# Patient Record
Sex: FEMALE | Race: BLACK OR AFRICAN AMERICAN | Employment: FULL TIME | URBAN - METROPOLITAN AREA
[De-identification: names, ages, dates, MRNs, and addresses within clinical notes are randomized per-mention and may not be internally consistent; named-entity substitution may affect disease eponyms.]

---

## 2020-12-06 ENCOUNTER — HOSPITAL ENCOUNTER (EMERGENCY)
Age: 25
Discharge: HOME | End: 2020-12-06
Attending: EMERGENCY MEDICINE
Payer: MEDICAID

## 2020-12-06 ENCOUNTER — APPOINTMENT (OUTPATIENT)
Dept: GENERAL RADIOLOGY | Age: 25
End: 2020-12-06
Attending: PHYSICIAN ASSISTANT
Payer: MEDICAID

## 2020-12-06 VITALS
SYSTOLIC BLOOD PRESSURE: 142 MMHG | RESPIRATION RATE: 19 BRPM | DIASTOLIC BLOOD PRESSURE: 88 MMHG | WEIGHT: 260 LBS | HEART RATE: 65 BPM | TEMPERATURE: 97.8 F | BODY MASS INDEX: 39.4 KG/M2 | HEIGHT: 68 IN | OXYGEN SATURATION: 99 %

## 2020-12-06 DIAGNOSIS — S86.912A KNEE STRAIN, LEFT, INITIAL ENCOUNTER: Primary | ICD-10-CM

## 2020-12-06 PROCEDURE — 73564 X-RAY EXAM KNEE 4 OR MORE: CPT

## 2020-12-06 PROCEDURE — 99283 EMERGENCY DEPT VISIT LOW MDM: CPT

## 2020-12-06 RX ORDER — ESTRADIOL VALERATE 20 MG/ML
20 INJECTION INTRAMUSCULAR EVERY 2 WEEKS
COMMUNITY
Start: 2020-01-04

## 2020-12-06 RX ORDER — TRAMADOL HYDROCHLORIDE 50 MG/1
50 TABLET ORAL
Qty: 20 TAB | Refills: 0 | Status: SHIPPED | OUTPATIENT
Start: 2020-12-06 | End: 2020-12-09

## 2020-12-06 NOTE — ED PROVIDER NOTES
EMERGENCY DEPARTMENT HISTORY AND PHYSICAL EXAM    Date: 12/6/2020  Patient Name: Chris Valencia    History of Presenting Illness     Chief Complaint   Patient presents with    Knee Pain         History Provided By: Patient    Chris Valencia is a 31 y.o. female who presents to the emergency department C/O left knee pain. Reports left medial knee pain acute onset yesterday after moving from a's stooping to standing position once standing felt and heard a pop in her knee which caused her to fall. She is able to stand and bear weight however with increased pain. Pt denies head injury, neck pain, wounds, pain anywhere else, and any other sxs or complaints. PCP: Elliott Yañez MD    Current Outpatient Medications   Medication Sig Dispense Refill    estradiol valerate (DELESTROGEN) 20 mg/mL oil 20 mg by IntraMUSCular route Once every 2 weeks.  traMADoL (Ultram) 50 mg tablet Take 1 Tab by mouth every six (6) hours as needed for Pain for up to 3 days. Max Daily Amount: 200 mg. 20 Tab 0       Past History     Past Medical History:  History reviewed. No pertinent past medical history. Past Surgical History:  History reviewed. No pertinent surgical history. Family History:  History reviewed. No pertinent family history. Social History:  Social History     Tobacco Use    Smoking status: Never Smoker    Smokeless tobacco: Never Used   Substance Use Topics    Alcohol use: Not Currently    Drug use: No       Allergies:  No Known Allergies      Review of Systems   Review of Systems   Musculoskeletal: Positive for arthralgias. Skin: Negative for wound. All other systems reviewed and are negative. Physical Exam     Vitals:    12/06/20 1524   BP: (!) 142/88   Pulse: 65   Resp: 19   Temp: 97.8 °F (36.6 °C)   SpO2: 99%   Weight: 117.9 kg (260 lb)   Height: 5' 8\" (1.727 m)     Physical Exam  Vitals signs and nursing note reviewed. Constitutional:       General: She is not in acute distress. Appearance: Normal appearance. She is obese. She is not ill-appearing. HENT:      Head: Normocephalic and atraumatic. Eyes:      Extraocular Movements: Extraocular movements intact. Conjunctiva/sclera: Conjunctivae normal.      Pupils: Pupils are equal, round, and reactive to light. Neck:      Musculoskeletal: Normal range of motion and neck supple. Musculoskeletal:      Left hip: Normal.      Left knee: She exhibits decreased range of motion. She exhibits no swelling. Tenderness found. Left ankle: Normal. She exhibits normal pulse. Legs:    Skin:     General: Skin is warm and dry. Capillary Refill: Capillary refill takes less than 2 seconds. Neurological:      General: No focal deficit present. Mental Status: She is alert and oriented to person, place, and time. Psychiatric:         Mood and Affect: Mood normal.         Behavior: Behavior normal.       Diagnostic Study Results     Labs -   No results found for this or any previous visit (from the past 12 hour(s)). Radiologic Studies -   XR KNEE LT MIN 4 V   Final Result   IMPRESSION:      Mild degenerative changes. No acute bony abnormality. CT Results  (Last 48 hours)    None        CXR Results  (Last 48 hours)    None          Medications given in the ED-  Medications - No data to display      Medical Decision Making   I am the first provider for this patient. I reviewed the vital signs, available nursing notes, past medical history, past surgical history, family history and social history. Vital Signs-Reviewed the patient's vital signs. Pulse Oximetry Analysis - 99% on RA     Records Reviewed: Nursing Notes    Procedures:  Procedures    ED Course:   3:31 PM   Initial assessment performed. The patients presenting problems have been discussed, and they are in agreement with the care plan formulated and outlined with them.   I have encouraged them to ask questions as they arise throughout their visit. Discussion: 31 y.o. female ambulatory emergency department with 24 hours of left knee pain after standing up and hearing and feeling a pop. She is neurovascular intact appropriate vital signs negative x-rays. Plan for immobilizer crutches pain control orthopedic follow-up. Return precautions discussed. Diagnosis and Disposition       DISCHARGE NOTE:  Zena Galeazzi  results have been reviewed with her. She has been counseled regarding her diagnosis, treatment, and plan. She verbally conveys understanding and agreement of the signs, symptoms, diagnosis, treatment and prognosis and additionally agrees to follow up as discussed. She also agrees with the care-plan and conveys that all of her questions have been answered. I have also provided discharge instructions for her that include: educational information regarding their diagnosis and treatment, and list of reasons why they would want to return to the ED prior to their follow-up appointment, should her condition change. She has been provided with education for proper emergency department utilization. CLINICAL IMPRESSION:    1. Knee strain, left, initial encounter        PLAN:  1. D/C Home  2. Current Discharge Medication List      START taking these medications    Details   traMADoL (Ultram) 50 mg tablet Take 1 Tab by mouth every six (6) hours as needed for Pain for up to 3 days. Max Daily Amount: 200 mg. Qty: 20 Tab, Refills: 0    Associated Diagnoses: Knee strain, left, initial encounter           3.    Follow-up Information     Follow up With Specialties Details Why Contact Info    Franci Ramsey MD Orthopedic Surgery Schedule an appointment as soon as possible for a visit  [place] [address] [address] [facility]      [facility] EMERGENCY DEPT Emergency Medicine  As needed, If symptoms worsen [address] [address]  [phone]                  Please note that this dictation was completed with Vendscreen, the [employer] voice recognition software. Quite often unanticipated grammatical, syntax, homophones, and other interpretive errors are inadvertently transcribed by the computer software. Please disregard these errors. Please excuse any errors that have escaped final proofreading.

## 2020-12-06 NOTE — LETTER
NOTIFICATION RETURN TO WORK / SCHOOL 
 
12/6/2020 4:07 PM 
 
Ms. Alberta Kwon Gian [address] [address] To Whom It May Concern: 
 
Alberta Kwon Shmuelor is currently under the care of THE [facility] EMERGENCY DEPT. She will return to work 12/9/2020 If there are questions or concerns please have the patient contact our office.  
 
 
 
Sincerely, 
 
 
JAMES Steel

## 2020-12-06 NOTE — ED TRIAGE NOTES
Pt w/ c/o LEFT knee pain after hearing it pop yesterday when she stood up abruptly yesterday. Pt reports she is able to bear some weight but unable walk on it or bend it.

## 2021-08-17 ENCOUNTER — HOSPITAL ENCOUNTER (EMERGENCY)
Age: 26
Discharge: HOME | End: 2021-08-17
Attending: EMERGENCY MEDICINE
Payer: MEDICAID

## 2021-08-17 VITALS
HEART RATE: 93 BPM | RESPIRATION RATE: 20 BRPM | BODY MASS INDEX: 42.44 KG/M2 | DIASTOLIC BLOOD PRESSURE: 68 MMHG | TEMPERATURE: 98.2 F | WEIGHT: 280 LBS | OXYGEN SATURATION: 100 % | SYSTOLIC BLOOD PRESSURE: 156 MMHG | HEIGHT: 68 IN

## 2021-08-17 DIAGNOSIS — G43.009 MIGRAINE WITHOUT AURA AND WITHOUT STATUS MIGRAINOSUS, NOT INTRACTABLE: Primary | ICD-10-CM

## 2021-08-17 PROCEDURE — 99282 EMERGENCY DEPT VISIT SF MDM: CPT

## 2021-08-17 RX ORDER — KETOROLAC TROMETHAMINE 10 MG/1
10 TABLET, FILM COATED ORAL
Qty: 12 TABLET | Refills: 0 | Status: SHIPPED | OUTPATIENT
Start: 2021-08-17 | End: 2021-11-19

## 2021-08-17 NOTE — ED PROVIDER NOTES
EMERGENCY DEPARTMENT HISTORY AND PHYSICAL EXAM    Date: 8/17/2021  Patient Name: Jay Bermeo    History of Presenting Illness     Chief Complaint   Patient presents with    Migraine         History Provided By: Patient    Chief Complaint: migraine    HPI(Context):   12:46 PM  Jay Bermeo is a 31 y.o. female with PMHX of migraines who presents to the emergency department C/O migraine headache. Sxs started this AM. Gradual onset. HA is global. Pt took OTC medications and sxs improved. Pt missed work and states she is required to come to ED for work note. HA c/w past migraines with no atypical sxs. Pt recently had zahra applied to hair and scalp. Pt loosened them this AM. Pt denies fever, chills, neck pain, nausea, vomiting, head trauma, numbness, weakness, COVID exposures, and any other sxs or complaints. PCP: Elliott Yañez MD    Current Outpatient Medications   Medication Sig Dispense Refill    ketorolac (TORADOL) 10 mg tablet Take 1 Tablet by mouth every six (6) hours as needed for Pain. Take with food. Do NOT take with other NSAIDs (ibuprofen, naproxen) 12 Tablet 0    estradiol valerate (DELESTROGEN) 20 mg/mL oil 20 mg by IntraMUSCular route Once every 2 weeks. Past History     Past Medical History:  History reviewed. No pertinent past medical history. Past Surgical History:  History reviewed. No pertinent surgical history. Family History:  History reviewed. No pertinent family history. Social History:  Social History     Tobacco Use    Smoking status: Never Smoker    Smokeless tobacco: Never Used   Substance Use Topics    Alcohol use: Not Currently    Drug use: No       Allergies:  No Known Allergies      Review of Systems   Review of Systems   Constitutional: Negative for chills and fever. Eyes: Negative for photophobia and visual disturbance. Gastrointestinal: Negative for nausea and vomiting. Musculoskeletal: Negative for neck pain.    Neurological: Positive for headaches. Negative for weakness and numbness. All other systems reviewed and are negative. Physical Exam     Vitals:    08/17/21 1239   BP: (!) 156/68   Pulse: 93   Resp: 20   Temp: 98.2 °F (36.8 °C)   SpO2: 100%   Weight: 127 kg (280 lb)   Height: 5' 8\" (1.727 m)     Physical Exam  Vitals and nursing note reviewed. Constitutional:       General: She is not in acute distress. Appearance: She is well-developed. She is not diaphoretic. Comments: AA female in NAD. Alert. Appears comfortable. In fast track room   HENT:      Head: Normocephalic and atraumatic. Right Ear: External ear normal.      Left Ear: External ear normal.      Nose: Nose normal.   Eyes:      General: No scleral icterus. Right eye: No discharge. Left eye: No discharge. Extraocular Movements: Extraocular movements intact. Conjunctiva/sclera: Conjunctivae normal.      Pupils: Pupils are equal, round, and reactive to light. Cardiovascular:      Rate and Rhythm: Normal rate and regular rhythm. Heart sounds: Normal heart sounds. No murmur heard. No friction rub. No gallop. Pulmonary:      Effort: Pulmonary effort is normal. No tachypnea, accessory muscle usage or respiratory distress. Breath sounds: Normal breath sounds. No decreased breath sounds, wheezing, rhonchi or rales. Musculoskeletal:         General: Normal range of motion. Cervical back: Normal range of motion and neck supple. Skin:     General: Skin is warm and dry. Findings: No erythema or rash. Neurological:      Mental Status: She is alert and oriented to person, place, and time. GCS: GCS eye subscore is 4. GCS verbal subscore is 5. GCS motor subscore is 6. Cranial Nerves: Cranial nerves are intact. Sensory: Sensation is intact. Motor: Motor function is intact. Coordination: Coordination is intact. Gait: Gait is intact.    Psychiatric:         Behavior: Behavior normal. Judgment: Judgment normal.             Diagnostic Study Results     Labs -   No results found for this or any previous visit (from the past 12 hour(s)). No orders to display     CT Results  (Last 48 hours)    None        CXR Results  (Last 48 hours)    None          Medications given in the ED-  Medications - No data to display      Medical Decision Making   I am the first provider for this patient. I reviewed the vital signs, available nursing notes, past medical history, past surgical history, family history and social history. Vital Signs-Reviewed the patient's vital signs. Pulse Oximetry Analysis - 100% on RA. NORMAL     Records Reviewed: Nursing Notes    Provider Notes (Medical Decision Making): migraine, tension, cluster, sinusitis, pseudotumor, TA. Not c/w SAH. Doubt CVA/TIA or meningitis. Procedures:  Procedures    ED Course:   12:46 PM Initial assessment performed. The patients presenting problems have been discussed, and they are in agreement with the care plan formulated and outlined with them. I have encouraged them to ask questions as they arise throughout their visit. Diagnosis and Disposition       Gradual onset HA. Not thunderclap. HA similar to previous migraines. Came to ED for work note. No FND. Will Rx NSAIDs. Work note provided. FU with neurology. No indication for emergent intracranial imaging or LP. Reasons to RTED discussed with pt. All questions answered. Pt feels comfortable going home at this time. Pt expressed understanding and she agrees with plan. 1. Migraine without aura and without status migrainosus, not intractable        PLAN:  1. D/C Home  2. Discharge Medication List as of 8/17/2021  2:14 PM        3.    Follow-up Information     Follow up With Specialties Details Why Contact Maryan Purcell MD Neurology  may follow up with neurologist [address] [place], [place] [address] [place] [address]  [phone]      [facility] EMERGENCY DEPT Emergency [place] [address] [address]  [phone]        _______________________________    Attestations: This note is prepared by Meghana Calero PA-C.  _______________________________          Please note that this dictation was completed with [email or web address], the computer voice recognition software. Quite often unanticipated grammatical, syntax, homophones, and other interpretive errors are inadvertently transcribed by the computer software. Please disregard these errors. Please excuse any errors that have escaped final proofreading.

## 2021-08-17 NOTE — CALL BACK NOTE
I have set patient up with her pcp Vilma Najera on august [email or web address] pt was given a appointment reminder letter

## 2021-08-17 NOTE — LETTER
[facility] [place]  [facility] EMERGENCY DEPT  [address] [facility] [address] [phone]    Work/School Note    Date: 8/17/2021    To Whom It May concern:    Alberta Ty was seen and treated today in the emergency room by the following provider(s):  Attending Provider: Jay Harris DO  Physician Assistant: Uzair Torrez PA-C. Alberta Ty may return to work on 08/18/2021.     Sincerely,          Jenn Sullivan PA-C

## 2021-09-23 ENCOUNTER — HOSPITAL ENCOUNTER (EMERGENCY)
Age: 26
Discharge: HOME | End: 2021-09-23
Attending: EMERGENCY MEDICINE | Admitting: EMERGENCY MEDICINE
Payer: MEDICAID

## 2021-09-23 ENCOUNTER — APPOINTMENT (OUTPATIENT)
Dept: GENERAL RADIOLOGY | Age: 26
End: 2021-09-23
Attending: EMERGENCY MEDICINE
Payer: MEDICAID

## 2021-09-23 VITALS
HEART RATE: 77 BPM | DIASTOLIC BLOOD PRESSURE: 78 MMHG | TEMPERATURE: 98 F | HEIGHT: 69 IN | WEIGHT: 270 LBS | RESPIRATION RATE: 16 BRPM | OXYGEN SATURATION: 100 % | SYSTOLIC BLOOD PRESSURE: 120 MMHG | BODY MASS INDEX: 39.99 KG/M2

## 2021-09-23 DIAGNOSIS — S61.212A LACERATION OF RIGHT MIDDLE FINGER WITHOUT FOREIGN BODY WITHOUT DAMAGE TO NAIL, INITIAL ENCOUNTER: ICD-10-CM

## 2021-09-23 DIAGNOSIS — S60.00XA CONTUSION OF FINGER OF RIGHT HAND, INITIAL ENCOUNTER: Primary | ICD-10-CM

## 2021-09-23 PROCEDURE — 73140 X-RAY EXAM OF FINGER(S): CPT

## 2021-09-23 PROCEDURE — 99283 EMERGENCY DEPT VISIT LOW MDM: CPT

## 2021-09-23 NOTE — ED PROVIDER NOTES
EMERGENCY DEPARTMENT HISTORY AND PHYSICAL EXAM    Date: 9/23/2021  Patient Name: Seb Mcintyre    History of Presenting Illness     Chief Complaint   Patient presents with    Finger Pain         History Provided By: Patient    Chief Complaint: finger pain    HPI(Context):   3:15 PM  Seb Mcintyre is a 31 y.o. female who presents to the emergency department C/O right third finger injury. Associated sxs include swelling and pain. PTA, pt accidentally shut finger in car door. Pt applied pressure with bleeding stopping. Pt applied bandaid but came to ED to make sure no finger was not broken. Pain worse with movement. Pt denies numbness, weakness, and any other sxs or complaints. Tetanus UTD per patient    PCP: Otilio, MD Elliott    Current Outpatient Medications   Medication Sig Dispense Refill    estradiol valerate (DELESTROGEN) 20 mg/mL oil 20 mg by IntraMUSCular route Once every 2 weeks.  ketorolac (TORADOL) 10 mg tablet Take 1 Tablet by mouth every six (6) hours as needed for Pain. Take with food. Do NOT take with other NSAIDs (ibuprofen, naproxen) 12 Tablet 0       Past History     Past Medical History:  History reviewed. No pertinent past medical history. Past Surgical History:  History reviewed. No pertinent surgical history. Family History:  History reviewed. No pertinent family history. Social History:  Social History     Tobacco Use    Smoking status: Never Smoker    Smokeless tobacco: Never Used   Substance Use Topics    Alcohol use: Not Currently    Drug use: No       Allergies:  No Known Allergies      Review of Systems   Review of Systems   Musculoskeletal: Positive for arthralgias and joint swelling. Skin: Positive for wound. Negative for color change. Neurological: Negative for weakness and numbness. All other systems reviewed and are negative.       Physical Exam     Vitals:    09/23/21 1509   Pulse: 77   Resp: 16   Temp: 98 °F (36.7 °C)   SpO2: 100%   Weight: 122.5 kg (270 lb)   Height: 5' 9\" (1.753 m)     Physical Exam  Vitals and nursing note reviewed. Constitutional:       General: She is not in acute distress. Appearance: She is well-developed. She is not diaphoretic. Comments: AA female in NAD. Alert. HENT:      Head: Normocephalic and atraumatic. Right Ear: External ear normal.      Left Ear: External ear normal.      Nose: Nose normal.   Eyes:      General: No scleral icterus. Right eye: No discharge. Left eye: No discharge. Conjunctiva/sclera: Conjunctivae normal.   Cardiovascular:      Rate and Rhythm: Normal rate and regular rhythm. Pulses:           Radial pulses are 2+ on the right side and 2+ on the left side. Heart sounds: Normal heart sounds. Pulmonary:      Effort: Pulmonary effort is normal. No tachypnea or accessory muscle usage. Breath sounds: Normal breath sounds. No decreased breath sounds. Musculoskeletal:         General: Normal range of motion. Hands:       Cervical back: Normal range of motion. Skin:     General: Skin is warm and dry. Neurological:      Mental Status: She is alert and oriented to person, place, and time. Psychiatric:         Judgment: Judgment normal.             Diagnostic Study Results     Labs -   No results found for this or any previous visit (from the past 12 hour(s)). XR 3RD FINGER RT MIN 2 V   Final Result      Mild soft tissue swelling without evidence of fracture or retained radiopaque   foreign object. CT Results  (Last 48 hours)    None        CXR Results  (Last 48 hours)    None          Medications given in the ED-  Medications - No data to display      Medical Decision Making   I am the first provider for this patient. I reviewed the vital signs, available nursing notes, past medical history, past surgical history, family history and social history. Vital Signs-Reviewed the patient's vital signs. Pulse Oximetry Analysis - 100% on RA. NORMAL     Records Reviewed: Nursing Notes    Provider Notes (Medical Decision Making): contusion, sprain, strain, fx, ligamentous, laceration    Procedures:  Procedures    ED Course:   3:15 PM Initial assessment performed. The patients presenting problems have been discussed, and they are in agreement with the care plan formulated and outlined with them. I have encouraged them to ask questions as they arise throughout their visit. Diagnosis and Disposition       Imaging unremarkable. NVI. Small subcentimeter superficial laceration. Nothing to suture. Will heal fine. Cleansed and dressed. Finger splint. NSAIDs. Reasons to RTED discussed with pt. All questions answered. Pt feels comfortable going home at this time. Pt expressed understanding and she agrees with plan. 1. Contusion of finger of right hand, initial encounter    2. Laceration of right middle finger without foreign body without damage to nail, initial encounter        PLAN:  1. D/C Home  2. Current Discharge Medication List        3. Follow-up Information     Follow up With Specialties Details Why Contact Info    Alane Cockayne, MD Orthopedic Surgery, Hand Surgery   [address] [place] [address],[address] [place] [address] Dr      [facility] Emergency Medicine   [address] [place] [address]  [phone]        _______________________________    Attestations: This note is prepared by Constantine Aguilera PA-C.  _______________________________          Please note that this dictation was completed with [employer], the computer voice recognition software. Quite often unanticipated grammatical, syntax, homophones, and other interpretive errors are inadvertently transcribed by the computer software. Please disregard these errors. Please excuse any errors that have escaped final proofreading.

## 2021-09-23 NOTE — LETTER
[facility] [place]  [facility]  [address] [facility] [address] [phone]    Work/School Note    Date: 9/23/2021    To Whom It May concern:    Toña Ramirez was seen and treated today in the emergency room by the following provider(s):  Attending Provider: Kole Schwartz MD  Physician Assistant: Eligha Mohs, PA-C. Alberta Swenson was seen at [facility] Emergency Department on 09/23/2021.      Sincerely,          Chandra Mejia PA-C

## 2021-11-19 ENCOUNTER — APPOINTMENT (OUTPATIENT)
Dept: CT IMAGING | Age: 26
End: 2021-11-19
Attending: STUDENT IN AN ORGANIZED HEALTH CARE EDUCATION/TRAINING PROGRAM
Payer: MEDICAID

## 2021-11-19 ENCOUNTER — HOSPITAL ENCOUNTER (EMERGENCY)
Age: 26
Discharge: HOME | End: 2021-11-19
Attending: STUDENT IN AN ORGANIZED HEALTH CARE EDUCATION/TRAINING PROGRAM
Payer: MEDICAID

## 2021-11-19 VITALS
WEIGHT: 293 LBS | HEART RATE: 51 BPM | HEIGHT: 68 IN | OXYGEN SATURATION: 95 % | SYSTOLIC BLOOD PRESSURE: 162 MMHG | RESPIRATION RATE: 20 BRPM | DIASTOLIC BLOOD PRESSURE: 83 MMHG | BODY MASS INDEX: 44.41 KG/M2 | TEMPERATURE: 98.6 F

## 2021-11-19 VITALS
DIASTOLIC BLOOD PRESSURE: 73 MMHG | BODY MASS INDEX: 43.4 KG/M2 | WEIGHT: 293 LBS | RESPIRATION RATE: 17 BRPM | HEIGHT: 69 IN | HEART RATE: 60 BPM | OXYGEN SATURATION: 98 % | SYSTOLIC BLOOD PRESSURE: 169 MMHG | TEMPERATURE: 97.7 F

## 2021-11-19 DIAGNOSIS — G43.011 INTRACTABLE MIGRAINE WITHOUT AURA AND WITH STATUS MIGRAINOSUS: Primary | ICD-10-CM

## 2021-11-19 DIAGNOSIS — G43.009 MIGRAINE WITHOUT AURA AND WITHOUT STATUS MIGRAINOSUS, NOT INTRACTABLE: Primary | ICD-10-CM

## 2021-11-19 LAB
ANION GAP SERPL CALC-SCNC: 6 MMOL/L (ref 3–18)
BUN SERPL-MCNC: 8 MG/DL (ref 7–18)
BUN/CREAT SERPL: 9 (ref 12–20)
CALCIUM SERPL-MCNC: 9.3 MG/DL (ref 8.5–10.1)
CHLORIDE SERPL-SCNC: 103 MMOL/L (ref 100–111)
CO2 SERPL-SCNC: 26 MMOL/L (ref 21–32)
CREAT SERPL-MCNC: 0.86 MG/DL (ref 0.6–1.3)
GLUCOSE SERPL-MCNC: 108 MG/DL (ref 74–99)
HCG SERPL-ACNC: <1 MIU/ML (ref 0–10)
POTASSIUM SERPL-SCNC: 3.8 MMOL/L (ref 3.5–5.5)
SODIUM SERPL-SCNC: 135 MMOL/L (ref 136–145)

## 2021-11-19 PROCEDURE — 74011000636 HC RX REV CODE- 636: Performed by: STUDENT IN AN ORGANIZED HEALTH CARE EDUCATION/TRAINING PROGRAM

## 2021-11-19 PROCEDURE — 74011250637 HC RX REV CODE- 250/637: Performed by: STUDENT IN AN ORGANIZED HEALTH CARE EDUCATION/TRAINING PROGRAM

## 2021-11-19 PROCEDURE — 96365 THER/PROPH/DIAG IV INF INIT: CPT

## 2021-11-19 PROCEDURE — 84702 CHORIONIC GONADOTROPIN TEST: CPT

## 2021-11-19 PROCEDURE — 96375 TX/PRO/DX INJ NEW DRUG ADDON: CPT

## 2021-11-19 PROCEDURE — 74011250636 HC RX REV CODE- 250/636: Performed by: STUDENT IN AN ORGANIZED HEALTH CARE EDUCATION/TRAINING PROGRAM

## 2021-11-19 PROCEDURE — 99283 EMERGENCY DEPT VISIT LOW MDM: CPT

## 2021-11-19 PROCEDURE — 96374 THER/PROPH/DIAG INJ IV PUSH: CPT

## 2021-11-19 PROCEDURE — 70496 CT ANGIOGRAPHY HEAD: CPT

## 2021-11-19 PROCEDURE — 70450 CT HEAD/BRAIN W/O DYE: CPT

## 2021-11-19 PROCEDURE — 96366 THER/PROPH/DIAG IV INF ADDON: CPT

## 2021-11-19 PROCEDURE — 80048 BASIC METABOLIC PNL TOTAL CA: CPT

## 2021-11-19 RX ORDER — LABETALOL HCL 20 MG/4 ML
10 SYRINGE (ML) INTRAVENOUS ONCE
Status: COMPLETED | OUTPATIENT
Start: 2021-11-19 | End: 2021-11-19

## 2021-11-19 RX ORDER — PROCHLORPERAZINE EDISYLATE 5 MG/ML
10 INJECTION INTRAMUSCULAR; INTRAVENOUS ONCE
Status: COMPLETED | OUTPATIENT
Start: 2021-11-19 | End: 2021-11-19

## 2021-11-19 RX ORDER — KETOROLAC TROMETHAMINE 30 MG/ML
15 INJECTION, SOLUTION INTRAMUSCULAR; INTRAVENOUS ONCE
Status: COMPLETED | OUTPATIENT
Start: 2021-11-19 | End: 2021-11-19

## 2021-11-19 RX ORDER — DEXAMETHASONE SODIUM PHOSPHATE 4 MG/ML
10 INJECTION, SOLUTION INTRA-ARTICULAR; INTRALESIONAL; INTRAMUSCULAR; INTRAVENOUS; SOFT TISSUE ONCE
Status: COMPLETED | OUTPATIENT
Start: 2021-11-19 | End: 2021-11-19

## 2021-11-19 RX ORDER — DIPHENHYDRAMINE HCL 25 MG
25 CAPSULE ORAL
Status: COMPLETED | OUTPATIENT
Start: 2021-11-19 | End: 2021-11-19

## 2021-11-19 RX ORDER — DIHYDROERGOTAMINE MESYLATE 1 MG/ML
1 INJECTION, SOLUTION INTRAMUSCULAR; INTRAVENOUS; SUBCUTANEOUS ONCE
Status: COMPLETED | OUTPATIENT
Start: 2021-11-19 | End: 2021-11-19

## 2021-11-19 RX ORDER — MAGNESIUM SULFATE HEPTAHYDRATE 40 MG/ML
2 INJECTION, SOLUTION INTRAVENOUS ONCE
Status: COMPLETED | OUTPATIENT
Start: 2021-11-19 | End: 2021-11-19

## 2021-11-19 RX ORDER — ONDANSETRON 4 MG/1
4 TABLET, ORALLY DISINTEGRATING ORAL
Qty: 20 TABLET | Refills: 0 | Status: SHIPPED | OUTPATIENT
Start: 2021-11-19

## 2021-11-19 RX ORDER — ONDANSETRON 2 MG/ML
4 INJECTION INTRAMUSCULAR; INTRAVENOUS ONCE
Status: COMPLETED | OUTPATIENT
Start: 2021-11-19 | End: 2021-11-19

## 2021-11-19 RX ORDER — BUTALBITAL, ACETAMINOPHEN AND CAFFEINE 300; 40; 50 MG/1; MG/1; MG/1
1 CAPSULE ORAL
Qty: 20 CAPSULE | Refills: 0 | Status: SHIPPED | OUTPATIENT
Start: 2021-11-19

## 2021-11-19 RX ADMIN — PROCHLORPERAZINE EDISYLATE 10 MG: 5 INJECTION INTRAMUSCULAR; INTRAVENOUS at 08:46

## 2021-11-19 RX ADMIN — KETOROLAC TROMETHAMINE 15 MG: 30 INJECTION, SOLUTION INTRAMUSCULAR at 08:46

## 2021-11-19 RX ADMIN — DEXAMETHASONE SODIUM PHOSPHATE 10 MG: 4 INJECTION, SOLUTION INTRAMUSCULAR; INTRAVENOUS at 16:09

## 2021-11-19 RX ADMIN — MAGNESIUM SULFATE HEPTAHYDRATE 2 G: 40 INJECTION, SOLUTION INTRAVENOUS at 16:17

## 2021-11-19 RX ADMIN — DIHYDROERGOTAMINE MESYLATE 1 MG: 1 INJECTION, SOLUTION INTRAMUSCULAR; INTRAVENOUS; SUBCUTANEOUS at 18:23

## 2021-11-19 RX ADMIN — SODIUM CHLORIDE 1000 ML: 9 INJECTION, SOLUTION INTRAVENOUS at 16:14

## 2021-11-19 RX ADMIN — LABETALOL HYDROCHLORIDE 10 MG: 5 INJECTION, SOLUTION INTRAVENOUS at 16:14

## 2021-11-19 RX ADMIN — KETOROLAC TROMETHAMINE 15 MG: 30 INJECTION, SOLUTION INTRAMUSCULAR at 18:20

## 2021-11-19 RX ADMIN — ONDANSETRON 4 MG: 2 INJECTION INTRAMUSCULAR; INTRAVENOUS at 16:10

## 2021-11-19 RX ADMIN — IOPAMIDOL 100 ML: 755 INJECTION, SOLUTION INTRAVENOUS at 16:21

## 2021-11-19 RX ADMIN — DIPHENHYDRAMINE HYDROCHLORIDE 25 MG: 25 CAPSULE ORAL at 08:46

## 2021-11-19 NOTE — Clinical Note
[facility] [place]  [facility] EMERGENCY DEPT  [address] [facility] [address] [phone]    Work/School Note    Date: 11/19/2021    To Whom It May concern:    Alberta Avina was seen and treated today in the emergency room by the following provider(s):  Attending Provider: Annia Paul DO. Alberta Avina is excused from work/school on 11/19/21 and 11/20/21. She is medically clear to return to work/school on 11/21/2021.        Sincerely,          Karen Elroy DO

## 2021-11-19 NOTE — ED NOTES
Pt verbalized h/a is subsiding. Continues with IVF and mag sulfate. Tolerating well. Pt resting quietly on stretcher, call bell within reach.

## 2021-11-19 NOTE — ED TRIAGE NOTES
Ambulatory patient arrives with complaints of headache that began last night. Hx of migraines but feels worse than normal, no relief with tylenol or ibuprofen.

## 2021-11-19 NOTE — Clinical Note
[facility] [place]  [facility] EMERGENCY DEPT  [address]  [facility] [address] [phone]    Work/School Note    Date: 11/19/2021    To Whom It May concern:    Alberta Chacon was seen and treated today in the emergency room by the following provider(s):  Attending Provider: Aram Jennings DO. Alberta Chacon is excused from work/school on 11/19/21 and 11/20/21. She is medically clear to return to work/school on 11/21/2021.        Sincerely,          Bishop Goncalves DO

## 2021-11-19 NOTE — ED PROVIDER NOTES
EMERGENCY DEPARTMENT HISTORY AND PHYSICAL EXAM      Date: 11/19/2021  Patient Name: Linda Kidd    History of Presenting Illness     Chief Complaint   Patient presents with    Headache       History (Context): Linda Kidd is a 31 y.o. female with a past medical history significant for headache comes into the ED today due to headache. Patient seen in emergency department earlier in the day. States that symptoms have worsened since being discharged. Following improvement of her headache upon getting home she began to have throbbing to the frontal part of her head. She denies taking any further medication for treatment of her symptoms. Patient admits to having nausea and vomiting associated with her headache at this time. Patient denies any numbness, tingling, or weakness. She states severity of her symptoms as severe. She denies any alleviating or exacerbating factors to her headache. PCP: Elliott Yañez MD    Current Facility-Administered Medications   Medication Dose Route Frequency Provider Last Rate Last Admin    magnesium sulfate 2 g/50 ml IVPB (premix or compounded)  2 g IntraVENous ONCE Genaro Tafoya DO        dexamethasone (DECADRON) 4 mg/mL injection 10 mg  10 mg IntraVENous ONCE Genaro Tafoya DO        sodium chloride 0.9 % bolus infusion 1,000 mL  1,000 mL IntraVENous ONCE Aminata Pascal DO         Current Outpatient Medications   Medication Sig Dispense Refill    estradiol valerate (DELESTROGEN) 20 mg/mL oil 20 mg by IntraMUSCular route Once every 2 weeks. Past History     Past Medical History:   History reviewed. No pertinent past medical history. Past Surgical History:  History reviewed. No pertinent surgical history. Family History:  History reviewed. No pertinent family history. Social History:   Social History     Tobacco Use    Smoking status: Never Smoker    Smokeless tobacco: Never Used   Substance Use Topics    Alcohol use:  Yes Comment: social    Drug use: Yes     Types: Marijuana     Comment: social       Allergies:  No Known Allergies    PMH, PSH, family history, social history, allergies reviewed with the patient with significant items noted above. Review of Systems   Review of Systems   Constitutional: Negative for chills and fever. HENT: Negative for sore throat. Eyes: Negative for visual disturbance. Respiratory: Negative for shortness of breath. Cardiovascular: Negative for chest pain. Gastrointestinal: Positive for nausea and vomiting. Negative for abdominal pain. Genitourinary: Negative for difficulty urinating. Musculoskeletal: Negative for myalgias. Skin: Negative for rash. Neurological: Positive for headaches. Negative for weakness and numbness. Physical Exam     Vitals:    11/19/21 1421   BP: (!) 191/80   Pulse: 89   Resp: 18   Temp: 98.9 °F (37.2 °C)   SpO2: 95%   Weight: 140.6 kg (310 lb)   Height: 5' 8\" (1.727 m)       Physical Exam  Vitals and nursing note reviewed. Constitutional:       General: She is in acute distress (Mild distress). Appearance: Normal appearance. She is not ill-appearing or toxic-appearing. HENT:      Head: Normocephalic and atraumatic. Mouth/Throat:      Mouth: Mucous membranes are moist.   Eyes:      General: No scleral icterus. Conjunctiva/sclera: Conjunctivae normal.   Cardiovascular:      Rate and Rhythm: Normal rate and regular rhythm. Comments: Normal peripheral perfusion  Pulmonary:      Effort: Pulmonary effort is normal.      Breath sounds: Normal breath sounds. Abdominal:      General: There is no distension. Palpations: Abdomen is soft. Tenderness: There is no abdominal tenderness. Musculoskeletal:         General: No deformity. Normal range of motion. Cervical back: Normal range of motion and neck supple. Skin:     General: Skin is warm and dry. Findings: No rash.    Neurological:      General: No focal deficit present. Mental Status: She is alert and oriented to person, place, and time. Mental status is at baseline. Sensory: No sensory deficit. Motor: No weakness. Gait: Gait normal.   Psychiatric:         Mood and Affect: Mood normal.         Thought Content: Thought content normal.         Diagnostic Study Results     Labs -     Recent Results (from the past 12 hour(s))   BETA HCG, QT    Collection Time: 11/19/21  8:40 AM   Result Value Ref Range    Beta HCG, QT <1 0 - 10 MIU/ML      Labs Reviewed   METABOLIC PANEL, BASIC       Radiologic Studies -   CT HEAD WO CONT    (Results Pending)   CTA HEAD NECK W CONT    (Results Pending)     CT Results  (Last 48 hours)    None        CXR Results  (Last 48 hours)    None          The laboratory results, imaging results, and other diagnostic exams were reviewed in the EMR. Medical Decision Making   I am the first provider for this patient. I reviewed the vital signs, available nursing notes, past medical history, past surgical history, family history and social history. Vital Signs-Reviewed the patient's vital signs. Records Reviewed: Personally, on initial evaluation    MDM:   Yosi Solis presents with complaint of headache  DDX includes but is not limited to: SAH, migraine headache, frontal headache    Patient overall in distress secondary to pain but vitals grossly within normal limits with exception to hypertension. Concern for possible intracranial hemorrhage at this time as etiology for her symptoms. Will obtain lab work and imaging for further evaluation of patients complaint. Will continue to monitor and evaluate patient while in the ED.       Orders as below:  Orders Placed This Encounter    CT HEAD WO CONT    CTA HEAD NECK W CONT    BASIC METABOLIC PANEL    magnesium sulfate 2 g/50 ml IVPB (premix or compounded)    dexamethasone (DECADRON) 4 mg/mL injection 10 mg    sodium chloride 0.9 % bolus infusion 1,000 mL        ED Course:   ED Course as of 11/19/21 1851 Fri Nov 19, 2021   4899 Patient's BMP significant for sodium 135 otherwise labs gross within normal limits. We will continue to monitor patient. [DV]   0399 Patient continues to have pain despite multiple medications to treat her headache. Patient CTA of the head and neck does not show any acute abnormalities. Patient significant other extremely upset with lack of care for patient. Explained to him that patient has received multiple medications for treatment of her symptoms without significant alleviation. Explained that we were also waiting on imaging to result for further treatment of her symptoms. Further explained that not all headaches will resolve with treatment. Patient does state intermittent improvement of her headache with treatment however continues to endorse significant pain despite multiple medications. Will provide patient with DHE for further treatment at this time. [DV]   7932 Patient continues to endorse significant pain rated 8 out of 10 for her headache. Offered patient admission for status migrainosus however she declined at this time and states that she would rather go home. Patient's vitals continue to appear well. Will prescribe patient Zofran and Fioricet for further treatment of her symptoms. Patient was given return precautions and follow-up recommendations. Patient verbalized understanding and is without any further questions. [DV]      ED Course User Index  [DV] Enrique Fernandez DO           Procedures:  Procedures        Diagnosis and Disposition     CLINICAL IMPRESSION:  No diagnosis found. Current Discharge Medication List          Disposition: Home    Patient condition at time of disposition: Stable    DISCHARGE NOTE:   Pt has been reexamined. Patient has no new complaints, changes, or physical findings. Care plan outlined and precautions discussed. Results were reviewed with the patient.  All medications were reviewed with the patient. All of pt's questions and concerns were addressed. Alarm symptoms and return precautions associated with chief complaint and evaluation were reviewed with the patient in detail. The patient demonstrated adequate understanding. Patient was instructed to follow up with PCP, Neurology, as well as strict return precautions to the ED upon further deterioration. Patient is ready to go home. The patient is happy with this plan        Dragon Disclaimer     Please note that this dictation was completed with [employer], the computer voice recognition software. Quite often unanticipated grammatical, syntax, homophones, and other interpretive errors are inadvertently transcribed by the computer software. Please disregard these errors. Please excuse any errors that have escaped final proofreading. Betty COPE.

## 2021-11-19 NOTE — ED PROVIDER NOTES
EMERGENCY DEPARTMENT HISTORY AND PHYSICAL EXAM      Date: 11/19/2021  Patient Name: Kaylan Cordova    History of Presenting Illness     Chief Complaint   Patient presents with    Headache       History (Context): Kaylan Cordova is a 31 y.o. female with a past medical history significant for migraines comes into the ED today due to headache. Patient states headache began last night and has worsened since onset. She localizes the headache to the frontal retro-orbital region of the head, described as throbbing, constant, exacerbated by laying down, and without any alleviating factors. She did take Tylenol and ibuprofen for treatment of her symptoms without improvement. She denies any numbness, tingling, weakness, blurry vision, chest pain, dyspnea, abdominal pain, nausea, or vomiting. She states headache is worse than her normal migraines. She describes the severity as severe. She denies any photophobia or phonophobia. PCP: Elliott Yañez MD    Current Facility-Administered Medications   Medication Dose Route Frequency Provider Last Rate Last Admin    ketorolac (TORADOL) injection 15 mg  15 mg IntraVENous ONCE Genaro Tafoya DO        diphenhydrAMINE (BENADRYL) capsule 25 mg  25 mg Oral NOW Genaro Tafoya DO        prochlorperazine (COMPAZINE) injection 10 mg  10 mg IntraVENous ONCE Breann Lewis DO         Current Outpatient Medications   Medication Sig Dispense Refill    estradiol valerate (DELESTROGEN) 20 mg/mL oil 20 mg by IntraMUSCular route Once every 2 weeks. Past History     Past Medical History:   History reviewed. No pertinent past medical history. Past Surgical History:  History reviewed. No pertinent surgical history. Family History:  History reviewed. No pertinent family history.     Social History:   Social History     Tobacco Use    Smoking status: Never Smoker    Smokeless tobacco: Never Used   Substance Use Topics    Alcohol use: Yes     Comment: social  Drug use: Yes     Types: Marijuana     Comment: social       Allergies:  No Known Allergies    PMH, PSH, family history, social history, allergies reviewed with the patient with significant items noted above. Review of Systems   Review of Systems   Constitutional: Negative for chills and fever. HENT: Negative for sore throat. Eyes: Negative for photophobia and visual disturbance. Respiratory: Negative for shortness of breath. Cardiovascular: Negative for chest pain. Gastrointestinal: Negative for abdominal pain, nausea and vomiting. Genitourinary: Negative for difficulty urinating. Musculoskeletal: Negative for myalgias. Skin: Negative for rash. Neurological: Positive for headaches. Negative for syncope, weakness, light-headedness and numbness. Physical Exam     Vitals:    11/19/21 0806   BP: (!) 169/73   Pulse: 60   Resp: 17   Temp: 97.7 °F (36.5 °C)   SpO2: 98%   Weight: 140.6 kg (310 lb)   Height: 5' 9\" (1.753 m)       Physical Exam  Vitals and nursing note reviewed. Constitutional:       General: She is in acute distress (Mild distress 2/2 pain). Appearance: Normal appearance. She is not ill-appearing or toxic-appearing. HENT:      Head: Normocephalic and atraumatic. Mouth/Throat:      Mouth: Mucous membranes are moist.   Eyes:      General: No scleral icterus. Extraocular Movements: Extraocular movements intact. Conjunctiva/sclera: Conjunctivae normal.      Pupils: Pupils are equal, round, and reactive to light. Cardiovascular:      Rate and Rhythm: Normal rate and regular rhythm. Comments: Normal peripheral perfusion  Pulmonary:      Effort: Pulmonary effort is normal. No respiratory distress. Abdominal:      General: There is no distension. Palpations: Abdomen is soft. Tenderness: There is no abdominal tenderness. Musculoskeletal:         General: No deformity. Normal range of motion.       Cervical back: Normal range of motion and neck supple. Skin:     General: Skin is warm and dry. Findings: No rash. Neurological:      General: No focal deficit present. Mental Status: She is alert and oriented to person, place, and time. Mental status is at baseline. Cranial Nerves: No cranial nerve deficit. Sensory: No sensory deficit. Motor: No weakness. Psychiatric:         Mood and Affect: Mood normal.         Thought Content: Thought content normal.         Diagnostic Study Results     Labs -   No results found for this or any previous visit (from the past 12 hour(s)). Labs Reviewed   BETA HCG, QT       Radiologic Studies -   No orders to display     CT Results  (Last 48 hours)    None        CXR Results  (Last 48 hours)    None          The laboratory results, imaging results, and other diagnostic exams were reviewed in the EMR. Medical Decision Making   I am the first provider for this patient. I reviewed the vital signs, available nursing notes, past medical history, past surgical history, family history and social history. Vital Signs-Reviewed the patient's vital signs. Records Reviewed: Personally, on initial evaluation    MDM:   Blaine Mcdaniel presents with complaint of Headache  DDX includes but is not limited to: Migraine headache, tension headache, SAH    Patient overall in mild distress 2/2 pain, mildly HTN, but otherwise vitals grossly WNL. Patient with non-focal neuro exam. Will obtain lab work for further evaluation of patients complaint. Will begin giving patient headache cocktail. Patient may require further imaging for evaluation of her symptoms. Will continue to monitor and evaluate patient while in the ED.       Orders as below:  Orders Placed This Encounter    HCG QT    ketorolac (TORADOL) injection 15 mg    diphenhydrAMINE (BENADRYL) capsule 25 mg    prochlorperazine (COMPAZINE) injection 10 mg        ED Course:   ED Course as of 11/19/21 0953   Fri Nov 19, 2021   4073 Patient asleep comfortably in the bed at this time. Will reevaluate patient in 30 minutes for further disposition. [DV]   5043 States improvement of her symptoms following medication treatment. Currently denies any neuro symptoms and continues to have no focal neuro deficits. Offered patient a CT scan for further evaluation of her symptoms due to her significant headache however she declined at this time. Explained to patient we would be unable to evaluate for possible head bleed without a CT scan. She states that she feels appropriate for discharge at this time. Provided patient with strict return precautions and follow-up recommendations. Patient verbalized understanding is without any further questions. Will discharge patient home. [DV]      ED Course User Index  [DV] Jameel Reyes DO           Procedures:  Procedures        Diagnosis and Disposition     CLINICAL IMPRESSION:  No diagnosis found. Current Discharge Medication List          Disposition: Home    Patient condition at time of disposition: Stable    DISCHARGE NOTE:   Pt has been reexamined. Patient has no new complaints, changes, or physical findings. Care plan outlined and precautions discussed. Results were reviewed with the patient. All medications were reviewed with the patient. All of pt's questions and concerns were addressed. Alarm symptoms and return precautions associated with chief complaint and evaluation were reviewed with the patient in detail. The patient demonstrated adequate understanding. Patient was instructed to follow up with PCP, as well as strict return precautions to the ED upon further deterioration. Patient is ready to go home. The patient is happy with this plan        Dragon Disclaimer     Please note that this dictation was completed with [employer], the computer voice recognition software.   Quite often unanticipated grammatical, syntax, homophones, and other interpretive errors are inadvertently transcribed by the computer software. Please disregard these errors. Please excuse any errors that have escaped final proofreading. Jamar COPE.

## 2021-11-19 NOTE — ED TRIAGE NOTES
Patient arrives in wheelchair with complaints of worsened headache. Was seen here earlier today and told to return if headache worsened.

## 2021-11-19 NOTE — Clinical Note
[facility] [place]  [facility] EMERGENCY DEPT  [address]  [facility] [id number] [phone]    Work/School Note    Date: 11/19/2021    To Whom It May concern:      Alberta Kwon was seen and treated today in the emergency room by the following provider(s):  Attending Provider: Shaheed Pike DO. Alberta Kwon is excused from work/school on 11/19/21. She is clear to return to work/school on 11/20/21.         Sincerely,          Maricruz Rios DO

## 2021-11-19 NOTE — Clinical Note
[facility] [place]  [facility] EMERGENCY DEPT  [address]  [facility] [id number] [phone]    Work/School Note    Date: 11/19/2021    To Whom It May concern:      Alberta Kwon was seen and treated today in the emergency room by the following provider(s):  Attending Provider: Shaheed Pike DO. Alberta Kwon is excused from work/school on 11/19/21. She is clear to return to work/school on 11/20/21.         Sincerely,          Maricruz Rios DO